# Patient Record
Sex: FEMALE | Race: WHITE
[De-identification: names, ages, dates, MRNs, and addresses within clinical notes are randomized per-mention and may not be internally consistent; named-entity substitution may affect disease eponyms.]

---

## 2021-03-19 ENCOUNTER — HOSPITAL ENCOUNTER (OUTPATIENT)
Dept: HOSPITAL 96 - M.LAB | Age: 51
End: 2021-03-19
Attending: ORTHOPAEDIC SURGERY
Payer: COMMERCIAL

## 2021-03-19 DIAGNOSIS — Z01.812: Primary | ICD-10-CM

## 2021-03-19 DIAGNOSIS — Z20.822: ICD-10-CM

## 2021-03-24 ENCOUNTER — HOSPITAL ENCOUNTER (OUTPATIENT)
Dept: HOSPITAL 96 - M.SUR | Age: 51
Discharge: HOME | End: 2021-03-24
Attending: ORTHOPAEDIC SURGERY
Payer: COMMERCIAL

## 2021-03-24 DIAGNOSIS — S83.242A: ICD-10-CM

## 2021-03-24 DIAGNOSIS — M19.90: ICD-10-CM

## 2021-03-24 DIAGNOSIS — Z79.899: ICD-10-CM

## 2021-03-24 DIAGNOSIS — M25.562: Primary | ICD-10-CM

## 2021-03-24 DIAGNOSIS — J45.909: ICD-10-CM

## 2021-03-24 DIAGNOSIS — M22.42: ICD-10-CM

## 2021-03-24 DIAGNOSIS — M23.42: ICD-10-CM

## 2021-03-24 NOTE — OP
[address]  [address]                    OPERATIVE REPORT              
_______________________________________________________________________________
 
Name:       NOELLE BERRY                  Room:                      [facility]#:  [id number]      Account #:      [id number]  
Admission:  03/24/21     Attend Phys:    Vin Rivas II
Discharge:               Date of Birth:  [birth year]  
         Report #: [id number]
                                                                     [id number]  
_______________________________________________________________________________
THIS REPORT FOR:  
 
cc:  Tad Chen Herbert E. DO                                                ~
     Vin Rivas II, DO       
 
DATE OF SERVICE:  03/24/2021
 
 
PREOPERATIVE DIAGNOSIS:  Left knee medial meniscus tear.
 
POSTOPERATIVE DIAGNOSES:
1.  Left knee medial meniscus tear.
2.  Grade 3 chondromalacia, patellofemoral groove.
3.  Loose bodies x 2 measuring 1 cm.
 
PROCEDURE PERFORMED:
1.  Left knee arthroscopic surgery with partial medial meniscectomy.
2.  Abrasion chondroplasty of the patellofemoral groove down to bleeding bone.
3.  Excision of loose bodies x 2 measuring approximately 1 cm.
 
SURGEON:  Vin Rivas II, DO.
 
ASSISTANT:  TREVER Acuna.
 
ANESTHESIA:  Per operative record.
 
ESTIMATED BLOOD LOSS:  Minimal.
 
ANTIBIOTICS:  Per operative record.
 
DRAINS:  None.
 
COMPLICATIONS:  None.
 
CONDITION:  The patient stable to recovery room.
 
DESCRIPTION OF PROCEDURE:  The patient was taken to the operative suite and
placed supine on the operative table, given appropriate anesthesia.  The
patient's left knee was sterilely prepped and draped in a well-padded knee
arthroscopic mcqueen.  Surgery began by medial and lateral portal incisions.  The
arthroscope was advanced in the joint.  There was ____ posterior medial meniscus
tear, it was extending around towards the root.  This was debrided utilizing
baskets and shaver back to good stable margins and then smoothed with Coblation
wand in appropriate fashion.  There was shown to be grade 3 chondromalacia to
patellofemoral groove with loose fibrillated cartilage, which was debrided
 
 
 
[place] [address]                    OPERATIVE REPORT              
_______________________________________________________________________________
 
Name:       NOELLE BERRY                  Room:                      [facility]#:  [id number]      Account #:      [id number]  
Admission:  03/24/21     Attend Phys:    Vin Rivas II
Discharge:               Date of Birth:  [birth year]  
         Report #: [id number]
                                                                     [id number]  
_______________________________________________________________________________
 
utilizing shaver down to bleeding bone and then smoothed utilizing Coblation
wand.  There was shown to be 2 loose bodies measuring approximately 1 cm in
aggregate, which were debrided utilizing shaver and removed in morselized pieces
through the medial incision.  Final irrigation was performed of the knee, it was
drained of arthroscopic fluid, closed with 4-0 nylon in simple fashion. 
Dermabond and sterile dressing applied.  The patient transported to recovery
room in stable condition.  Counts were correct throughout the procedure.
 
 
 
 
 
 
 
 
 
 
 
 
 
 
 
 
 
 
 
 
 
 
 
 
 
 
 
 
 
 
 
 
 
 
 
 
                       
                                        By:                                
                 
D: 03/25/21 0034_______________________________________
T: 03/25/21 0246Vin Rivas II, DO        /nt

## 2021-03-24 NOTE — EKG
[place] [address]
Phone:  [phone]                     ELECTROCARDIOGRAM REPORT      
_______________________________________________________________________________
 
Name:         NOELLE BERRY                 Room:                     [facility]#:    [id number]     Account #:     [id number]  
Admission:    21    Attend Phys:   Vin Rivas,
Discharge:                Date of Birth: [birth year]  
Date of Service: 21  Report #:      [id number]
        [id number]
_______________________________________________________________________________
THIS REPORT FOR:  //name//                      
 
                          [facility]
                                       
Test Date:    2021               Test Time:    07:30:18
Pat Name:     NOELLE BERYR              Department:   
Patient ID:   [id number]            Room:          
Gender:                               Technician:   
:          1970               Requested By: Vin Rvias
Order Number: [id number]    Reading MD:   Omero Colon
                                 Measurements
Intervals                              Axis          
Rate:         73                       P:            26
MA:           171                      QRS:          29
QRSD:         92                       T:            48
QT:           381                                    
QTc:          420                                    
                           Interpretive Statements
Sinus rhythm
Low voltage, precordial leads
No previous ECG available for comparison
Electronically Signed On 3- 10:14:46 CDT by Omero Colon
[email or web address]
 
 
 
 
 
 
 
 
 
 
 
 
 
 
 
 
 
 
 
 
 
  <ELECTRONICALLY SIGNED>
                                           By: Omero Colon MD, [facility]     
  21     1014
D: 21   _____________________________________
T: 21   Omero Colon MD, FACC       /EPI

## 2021-04-05 ENCOUNTER — HOSPITAL ENCOUNTER (EMERGENCY)
Dept: HOSPITAL 96 - M.ERS | Age: 51
Discharge: HOME | End: 2021-04-05
Payer: COMMERCIAL

## 2021-04-05 VITALS — HEIGHT: 70 IN | BODY MASS INDEX: 30.07 KG/M2 | WEIGHT: 210.01 LBS

## 2021-04-05 VITALS — DIASTOLIC BLOOD PRESSURE: 87 MMHG | SYSTOLIC BLOOD PRESSURE: 148 MMHG

## 2021-04-05 DIAGNOSIS — Z90.49: ICD-10-CM

## 2021-04-05 DIAGNOSIS — Z90.711: ICD-10-CM

## 2021-04-05 DIAGNOSIS — G89.18: ICD-10-CM

## 2021-04-05 DIAGNOSIS — M71.22: Primary | ICD-10-CM

## 2021-04-05 LAB
ABSOLUTE BASOPHILS: 0 THOU/UL (ref 0–0.2)
ABSOLUTE EOSINOPHILS: 0.2 THOU/UL (ref 0–0.7)
ABSOLUTE MONOCYTES: 0.6 THOU/UL (ref 0–1.2)
ALBUMIN SERPL-MCNC: 3.8 G/DL (ref 3.4–5)
ALP SERPL-CCNC: 79 U/L (ref 46–116)
ALT SERPL-CCNC: 41 U/L (ref 30–65)
ANION GAP SERPL CALC-SCNC: 9 MMOL/L (ref 7–16)
AST SERPL-CCNC: 22 U/L (ref 15–37)
BASOPHILS NFR BLD AUTO: 0.5 %
BILIRUB SERPL-MCNC: 0.2 MG/DL
BUN SERPL-MCNC: 12 MG/DL (ref 7–18)
CALCIUM SERPL-MCNC: 9.1 MG/DL (ref 8.5–10.1)
CHLORIDE SERPL-SCNC: 104 MMOL/L (ref 98–107)
CO2 SERPL-SCNC: 29 MMOL/L (ref 21–32)
CREAT SERPL-MCNC: 0.8 MG/DL (ref 0.6–1.3)
EOSINOPHIL NFR BLD: 2.7 %
GLUCOSE SERPL-MCNC: 94 MG/DL (ref 70–99)
GRANULOCYTES NFR BLD MANUAL: 50 %
HCT VFR BLD CALC: 38.6 % (ref 37–47)
HGB BLD-MCNC: 12.9 GM/DL (ref 12–15)
LYMPHOCYTES # BLD: 2.8 THOU/UL (ref 0.8–5.3)
LYMPHOCYTES NFR BLD AUTO: 38.8 %
MAGNESIUM SERPL-MCNC: 2.2 MG/DL (ref 1.8–2.4)
MCH RBC QN AUTO: 29.1 PG (ref 26–34)
MCHC RBC AUTO-ENTMCNC: 33.5 G/DL (ref 28–37)
MCV RBC: 86.9 FL (ref 80–100)
MONOCYTES NFR BLD: 8 %
MPV: 7.9 FL. (ref 7.2–11.1)
NEUTROPHILS # BLD: 3.6 THOU/UL (ref 1.6–8.1)
NUCLEATED RBCS: 0 /100WBC
PLATELET COUNT*: 316 THOU/UL (ref 150–400)
POTASSIUM SERPL-SCNC: 4.1 MMOL/L (ref 3.5–5.1)
PROT SERPL-MCNC: 7.6 G/DL (ref 6.4–8.2)
RBC # BLD AUTO: 4.44 MIL/UL (ref 4.2–5)
RDW-CV: 12.4 % (ref 10.5–14.5)
SODIUM SERPL-SCNC: 142 MMOL/L (ref 136–145)
WBC # BLD AUTO: 7.3 THOU/UL (ref 4–11)